# Patient Record
Sex: FEMALE | Race: OTHER | URBAN - METROPOLITAN AREA
[De-identification: names, ages, dates, MRNs, and addresses within clinical notes are randomized per-mention and may not be internally consistent; named-entity substitution may affect disease eponyms.]

---

## 2018-03-23 ENCOUNTER — APPOINTMENT (RX ONLY)
Dept: URBAN - METROPOLITAN AREA CLINIC 51 | Facility: CLINIC | Age: 33
Setting detail: DERMATOLOGY
End: 2018-03-23

## 2018-03-23 DIAGNOSIS — Z41.9 ENCOUNTER FOR PROCEDURE FOR PURPOSES OTHER THAN REMEDYING HEALTH STATE, UNSPECIFIED: ICD-10-CM

## 2018-03-23 PROCEDURE — ? LASER HAIR REMOVAL

## 2018-03-23 NOTE — PROCEDURE: LASER HAIR REMOVAL
Spot Size: 10 mm
Post-Care Instructions: I reviewed with the patient in detail post-care instructions. Patient should avoid sun for a minimum of 4 weeks before and after treatment.
Anesthesia Type: 1% lidocaine with epinephrine
Spot Size: 8 mm
Pulse Duration: auto
Fluence (Will Not Render If 0): [address] [facility]
Post-Procedure Care: Immediate endpoint: perifollicular erythema and edema. Hydrocortisone cream applied. Post care reviewed with patient.
Render Post-Care In The Note: No
Treatment Number: 0
Laser Type: diode 810nm
Number Of Prepaid Treatments (Will Not Render If 0): 6
Cooling: DCD setting
Detail Level: Detailed
Cooling: chill tip
Total Pulses: [address]
Total Pulses: [address]
Fluence (Will Not Render If 0): 10
Fluence (Will Not Render If 0): [address]
Fluence (Will Not Render If 0): 15
Pre-Procedure: Prior to proceeding the treatment areas were cleaned and all present put on their eye protection.
Treatment Number: 1
Consent: Written consent obtained, risks reviewed including but not limited to crusting, scabbing, blistering, scarring, darker or lighter pigmentary change, paradoxical hair regrowth, incomplete removal of hair and infection.
Topical Anesthesia Type: 9.6% lidocaine

## 2018-04-20 ENCOUNTER — APPOINTMENT (RX ONLY)
Dept: URBAN - METROPOLITAN AREA CLINIC 51 | Facility: CLINIC | Age: 33
Setting detail: DERMATOLOGY
End: 2018-04-20

## 2018-04-20 DIAGNOSIS — Z41.9 ENCOUNTER FOR PROCEDURE FOR PURPOSES OTHER THAN REMEDYING HEALTH STATE, UNSPECIFIED: ICD-10-CM

## 2018-04-20 PROCEDURE — ? LASER HAIR REMOVAL

## 2018-04-20 NOTE — HPI: COSMETIC (LASER HAIR REMOVAL)
Have You Had Laser Hair Removal Before?: has had previous treatment
When Outside In The Sun, Do You...: mostly tans, rarely burns
When Was Your Last Laser Treatment?: 03/23/2018
Number Of Treatments: 1

## 2018-04-20 NOTE — PROCEDURE: LASER HAIR REMOVAL
Spot Size: 10 mm
Treatment Number: 0
Fluence (Will Not Render If 0): [address]
Pulse Duration: auto
Spot Size: 8 mm
Pre-Procedure: Prior to proceeding the treatment areas were cleaned and all present put on their eye protection.
Post-Procedure Care: Immediate endpoint: perifollicular erythema and edema. Hydrocortisone cream applied. Post care reviewed with patient.
Render Post-Care In The Note: No
Consent: Written consent obtained, risks reviewed including but not limited to crusting, scabbing, blistering, scarring, darker or lighter pigmentary change, paradoxical hair regrowth, incomplete removal of hair and infection.
Total Pulses: [address]
Number Of Prepaid Treatments (Will Not Render If 0): 6
Topical Anesthesia Type: 9.6% lidocaine
Laser Type: diode 810nm
Fluence (Will Not Render If 0): 10
Cooling: chill tip
Fluence (Will Not Render If 0): 15
Cooling: DCD setting
Anesthesia Type: 1% lidocaine with epinephrine
Total Pulses: [address]
Post-Care Instructions: I reviewed with the patient in detail post-care instructions. Patient should avoid sun for a minimum of 4 weeks before and after treatment.
Detail Level: Detailed
Treatment Number: 2
Fluence (Will Not Render If 0): [address] [facility]

## 2018-05-16 ENCOUNTER — APPOINTMENT (RX ONLY)
Dept: URBAN - METROPOLITAN AREA CLINIC 51 | Facility: CLINIC | Age: 33
Setting detail: DERMATOLOGY
End: 2018-05-16

## 2018-05-16 DIAGNOSIS — Z41.9 ENCOUNTER FOR PROCEDURE FOR PURPOSES OTHER THAN REMEDYING HEALTH STATE, UNSPECIFIED: ICD-10-CM

## 2018-05-16 PROCEDURE — ? LASER HAIR REMOVAL

## 2018-05-16 NOTE — PROCEDURE: LASER HAIR REMOVAL
Spot Size: 1.5 mm
Total Pulses: 28
Laser Type: diode 810nm
Number Of Prepaid Treatments (Will Not Render If 0): 6
Fluence (Will Not Render If 0): [address] [facility]
Cooling: DCD setting
Post-Care Instructions: I reviewed with the patient in detail post-care instructions. Patient should avoid sun for a minimum of 4 weeks before and after treatment.
Treatment Number: 0
Treatment Number: 3
Pulse Duration: 0.45 ms
Fluence (Will Not Render If 0): [address] [facility], [place]
Pre-Procedure: Prior to proceeding the treatment areas were cleaned and all present put on their eye protection.
Fluence (Will Not Render If 0): 20
Consent: Written consent obtained, risks reviewed including but not limited to crusting, scabbing, blistering, scarring, darker or lighter pigmentary change, paradoxical hair regrowth, incomplete removal of hair and infection.
Total Pulses: [address]
Detail Level: Zone
Fluence (Will Not Render If 0): 12
Cooling: chill tip
Post-Procedure Care: Mild erythema and  SPF 30 Applied. Hydrocortisone applied. Post care reviewed with patient and instructed to call with any concerns.
Render Post-Care In The Note: No
Spot Size: 8 mm

## 2018-05-16 NOTE — HPI: COSMETIC (LASER HAIR REMOVAL)
Have You Had Laser Hair Removal Before?: has had previous treatment
When Was Your Last Laser Treatment?: 04/20/2018
Number Of Treatments: 2

## 2018-06-13 ENCOUNTER — APPOINTMENT (RX ONLY)
Dept: URBAN - METROPOLITAN AREA CLINIC 51 | Facility: CLINIC | Age: 33
Setting detail: DERMATOLOGY
End: 2018-06-13

## 2018-06-13 DIAGNOSIS — Z41.9 ENCOUNTER FOR PROCEDURE FOR PURPOSES OTHER THAN REMEDYING HEALTH STATE, UNSPECIFIED: ICD-10-CM

## 2018-06-13 PROCEDURE — ? LASER HAIR REMOVAL

## 2018-06-13 NOTE — PROCEDURE: LASER HAIR REMOVAL
Treatment Number: 4
Treatment Number: 0
Post-Care Instructions: I reviewed with the patient in detail post-care instructions. Patient should avoid sun for a minimum of 4 weeks before and after treatment.
Fluence (Will Not Render If 0): [address] [facility]
Number Of Prepaid Treatments (Will Not Render If 0): 6
Total Pulses: [address]
Spot Size: 1.5 mm
Fluence (Will Not Render If 0): [address] [facility]
Pulse Duration: 0.45 ms
Fluence (Will Not Render If 0): [address] [facility]
Pre-Procedure: Prior to proceeding the treatment areas were cleaned and all present put on their eye protection.
Detail Level: Zone
Cooling: DCD setting
Post-Procedure Care: Mild erythema and  SPF 30 Applied. Hydrocortisone applied. Post care reviewed with patient and instructed to call with any concerns.
Laser Type: diode 810nm
Consent: Written consent obtained, risks reviewed including but not limited to crusting, scabbing, blistering, scarring, darker or lighter pigmentary change, paradoxical hair regrowth, incomplete removal of hair and infection.
Render Post-Care In The Note: No
Cooling: chill tip
Total Pulses: 28
Fluence (Will Not Render If 0): 20
Spot Size: 8 mm

## 2018-06-13 NOTE — HPI: COSMETIC (LASER HAIR REMOVAL)
Have You Had Laser Hair Removal Before?: has had previous treatment
When Was Your Last Laser Treatment?: 05/16/2018
Number Of Treatments: 3

## 2018-07-11 ENCOUNTER — APPOINTMENT (RX ONLY)
Dept: URBAN - METROPOLITAN AREA CLINIC 51 | Facility: CLINIC | Age: 33
Setting detail: DERMATOLOGY
End: 2018-07-11

## 2018-07-11 DIAGNOSIS — Z41.9 ENCOUNTER FOR PROCEDURE FOR PURPOSES OTHER THAN REMEDYING HEALTH STATE, UNSPECIFIED: ICD-10-CM

## 2018-07-11 PROCEDURE — ? LASER HAIR REMOVAL

## 2018-07-11 NOTE — HPI: COSMETIC (LASER HAIR REMOVAL)
Have You Had Laser Hair Removal Before?: has had previous treatment
When Was Your Last Laser Treatment?: 06/13/2018
Number Of Treatments: 4

## 2018-07-11 NOTE — PROCEDURE: LASER HAIR REMOVAL
Pre-Procedure: Prior to proceeding the treatment areas were cleaned and all present put on their eye protection.
Treatment Number: 0
Fluence (Will Not Render If 0): [address] [facility]
Cooling: DCD setting
Number Of Prepaid Treatments (Will Not Render If 0): 6
Pulse Duration: auto
Spot Size: 10 mm
Spot Size: 8 mm
Render Post-Care In The Note: No
Treatment Number: 5
Post-Procedure Care: Immediate endpoint: perifollicular erythema and edema. Hydrocortisone cream applied. Post care reviewed with patient.
Laser Type: diode 810nm
Consent: Written consent obtained, risks reviewed including but not limited to crusting, scabbing, blistering, scarring, darker or lighter pigmentary change, paradoxical hair regrowth, incomplete removal of hair and infection.
Total Pulses: [address] [facility] [address]
Cooling: chill tip
Fluence (Will Not Render If 0): 15
Post-Care Instructions: I reviewed with the patient in detail post-care instructions. Patient should avoid sun for a minimum of 4 weeks before and after treatment.
Total Pulses: [address]
Anesthesia Type: 1% lidocaine with epinephrine
Fluence (Will Not Render If 0): [address]
Detail Level: Detailed
Topical Anesthesia Type: 9.6% lidocaine

## 2018-08-08 ENCOUNTER — APPOINTMENT (RX ONLY)
Dept: URBAN - METROPOLITAN AREA CLINIC 51 | Facility: CLINIC | Age: 33
Setting detail: DERMATOLOGY
End: 2018-08-08

## 2018-08-08 DIAGNOSIS — Z41.9 ENCOUNTER FOR PROCEDURE FOR PURPOSES OTHER THAN REMEDYING HEALTH STATE, UNSPECIFIED: ICD-10-CM

## 2018-08-08 PROCEDURE — ? LASER HAIR REMOVAL

## 2018-08-08 NOTE — PROCEDURE: LASER HAIR REMOVAL
Pulse Duration: auto
Consent: Written consent obtained, risks reviewed including but not limited to crusting, scabbing, blistering, scarring, darker or lighter pigmentary change, paradoxical hair regrowth, incomplete removal of hair and infection.
Treatment Number: 0
Anesthesia Type: 1% lidocaine with epinephrine
Total Pulses: [address]
Fluence (Will Not Render If 0): [address] [facility]
Fluence (Will Not Render If 0): 15
Spot Size: 10 mm
Total Pulses: [address]
Cooling: DCD setting
Laser Type: diode 810nm
Detail Level: Detailed
Render Post-Care In The Note: No
Fluence (Will Not Render If 0): [address]
Topical Anesthesia Type: 9.6% lidocaine
Post-Care Instructions: I reviewed with the patient in detail post-care instructions. Patient should avoid sun for a minimum of 4 weeks before and after treatment.
Pre-Procedure: Prior to proceeding the treatment areas were cleaned and all present put on their eye protection.
Fluence (Will Not Render If 0): [address] [facility]
Post-Procedure Care: Immediate endpoint: perifollicular erythema and edema. Hydrocortisone cream applied. Post care reviewed with patient.
Spot Size: 8 mm
Cooling: chill tip
Number Of Prepaid Treatments (Will Not Render If 0): 8
Treatment Number: 6

## 2018-08-08 NOTE — HPI: COSMETIC (LASER HAIR REMOVAL)
Have You Had Laser Hair Removal Before?: has had previous treatment
When Was Your Last Laser Treatment?: 07/11/2018
Number Of Treatments: 5

## 2018-09-26 ENCOUNTER — APPOINTMENT (RX ONLY)
Dept: URBAN - METROPOLITAN AREA CLINIC 51 | Facility: CLINIC | Age: 33
Setting detail: DERMATOLOGY
End: 2018-09-26

## 2018-09-26 DIAGNOSIS — Z41.9 ENCOUNTER FOR PROCEDURE FOR PURPOSES OTHER THAN REMEDYING HEALTH STATE, UNSPECIFIED: ICD-10-CM

## 2018-09-26 PROCEDURE — ? LASER HAIR REMOVAL

## 2018-09-26 NOTE — HPI: COSMETIC (LASER HAIR REMOVAL)
Have You Had Laser Hair Removal Before?: has had previous treatment
When Was Your Last Laser Treatment?: 08/08/2018
Number Of Treatments: 7

## 2018-09-26 NOTE — PROCEDURE: LASER HAIR REMOVAL
Anesthesia Type: 1% lidocaine with epinephrine
Laser Type: diode 810nm
Spot Size: 10 mm
Post-Procedure Care: Immediate endpoint: perifollicular erythema and edema. Hydrocortisone cream applied. Post care reviewed with patient.
Detail Level: Detailed
Pulse Duration: auto
Spot Size: 8 mm
Treatment Number: 0
Post-Care Instructions: I reviewed with the patient in detail post-care instructions. Patient should avoid sun for a minimum of 4 weeks before and after treatment.
Render Post-Care In The Note: No
Number Of Prepaid Treatments (Will Not Render If 0): 8
Total Pulses: [address] [facility] [address] [facility] [address]
Treatment Number: 7
Fluence (Will Not Render If 0): 15
Fluence (Will Not Render If 0): [address]
Total Pulses: [address]
Pre-Procedure: Prior to proceeding the treatment areas were cleaned and all present put on their eye protection.
Cooling: DCD setting
Consent: Written consent obtained, risks reviewed including but not limited to crusting, scabbing, blistering, scarring, darker or lighter pigmentary change, paradoxical hair regrowth, incomplete removal of hair and infection.
Topical Anesthesia Type: 9.6% lidocaine
Fluence (Will Not Render If 0): [address] [facility]
Cooling: chill tip
Fluence (Will Not Render If 0): [address] [facility]

## 2024-03-25 ENCOUNTER — APPOINTMENT (RX ONLY)
Dept: URBAN - METROPOLITAN AREA CLINIC 51 | Facility: CLINIC | Age: 39
Setting detail: DERMATOLOGY
End: 2024-03-25

## 2024-03-25 DIAGNOSIS — Z41.9 ENCOUNTER FOR PROCEDURE FOR PURPOSES OTHER THAN REMEDYING HEALTH STATE, UNSPECIFIED: ICD-10-CM

## 2024-03-25 PROCEDURE — ? LASER HAIR REMOVAL

## 2024-03-25 NOTE — PROCEDURE: LASER HAIR REMOVAL
Number Of Prepaid Treatments (Will Not Render If 0): 8
Number Of Prepaid Treatments (Will Not Render If 0): 0
Spot Size: 12 mm
Fluence (Will Not Render If 0): 20
Consent: Written consent obtained, risks reviewed including but not limited to crusting, scabbing, blistering, scarring, darker or lighter pigmentary change, paradoxical hair regrowth, incomplete removal of hair and infection.
Spot Size: 10 mm
Topical Anesthesia Type: 9.6% lidocaine
Post-Procedure Care: Immediate endpoint: perifollicular erythema and edema. Hydrocortisone cream applied. Post care reviewed with patient.
Cooling: chill tip
Were Eye Shields Employed?: No
Laser Type: Lumenis Splendor X
Fluence (Will Not Render If 0): 19
Cooling: DCD setting
Post-Care Instructions: I reviewed with the patient in detail post-care instructions. Patient should avoid sun for a minimum of 4 weeks before and after treatment.
Total Pulses (Settings #3): 548
Spot Size: 8 mm
Fluence (Will Not Render If 0): 26
Detail Level: Detailed
Eye Shield Text: Given the treatment area eye shields were inserted prior to treatment.
Anesthesia Type: 1% lidocaine with epinephrine
Total Pulses: 233
Fluence (Will Not Render If 0): 15
Pre-Procedure: Prior to proceeding the treatment areas were cleaned and all present put on their eye protection.

## 2024-03-25 NOTE — HPI: COSMETIC (LASER HAIR REMOVAL)
Have You Had Laser Hair Removal Before?: has had previous treatment
When Was Your Last Laser Treatment?: 09/26/2018
Number Of Treatments: 7

## 2024-05-20 ENCOUNTER — APPOINTMENT (RX ONLY)
Dept: URBAN - METROPOLITAN AREA CLINIC 51 | Facility: CLINIC | Age: 39
Setting detail: DERMATOLOGY
End: 2024-05-20

## 2024-05-20 DIAGNOSIS — Z41.9 ENCOUNTER FOR PROCEDURE FOR PURPOSES OTHER THAN REMEDYING HEALTH STATE, UNSPECIFIED: ICD-10-CM

## 2024-05-20 PROCEDURE — ? LASER HAIR REMOVAL

## 2024-05-20 NOTE — PROCEDURE: LASER HAIR REMOVAL
Render Post-Care In The Note: No
Cooling: chill tip
Fluence (Will Not Render If 0): 26
Number Of Prepaid Treatments (Will Not Render If 0): 0
Fluence (Will Not Render If 0): 20
Spot Size: 12 mm
Pre-Procedure: Prior to proceeding the treatment areas were cleaned and all present put on their eye protection.
Anesthesia Type: 1% lidocaine with epinephrine
Detail Level: Detailed
Total Pulses: 118
Treatment Number: 9
Fluence (Will Not Render If 0): 15
Topical Anesthesia Type: 9.6% lidocaine
Spot Size: 10 mm
Consent: Written consent obtained, risks reviewed including but not limited to crusting, scabbing, blistering, scarring, darker or lighter pigmentary change, paradoxical hair regrowth, incomplete removal of hair and infection.
Post-Procedure Care: Immediate endpoint: perifollicular erythema and edema. Hydrocortisone cream applied. Post care reviewed with patient.
Eye Shield Text: Given the treatment area eye shields were inserted prior to treatment.
Spot Size: 8 mm
Laser Type: Lumenis Splendor X
Total Pulses (Settings #3): 548
Fluence (Will Not Render If 0): 19
Cooling: DCD setting
Post-Care Instructions: I reviewed with the patient in detail post-care instructions. Patient should avoid sun for a minimum of 4 weeks before and after treatment.

## 2024-05-20 NOTE — HPI: COSMETIC (LASER HAIR REMOVAL)
Have You Had Laser Hair Removal Before?: has had previous treatment
When Was Your Last Laser Treatment?: 03/25/2024
Number Of Treatments: 8

## 2024-06-17 ENCOUNTER — APPOINTMENT (RX ONLY)
Dept: URBAN - METROPOLITAN AREA CLINIC 51 | Facility: CLINIC | Age: 39
Setting detail: DERMATOLOGY
End: 2024-06-17

## 2024-06-17 DIAGNOSIS — Z41.9 ENCOUNTER FOR PROCEDURE FOR PURPOSES OTHER THAN REMEDYING HEALTH STATE, UNSPECIFIED: ICD-10-CM

## 2024-06-17 PROCEDURE — ? LASER HAIR REMOVAL

## 2024-06-17 NOTE — HPI: COSMETIC (LASER HAIR REMOVAL)
Have You Had Laser Hair Removal Before?: has had previous treatment
When Was Your Last Laser Treatment?: 05/20/2024
Number Of Treatments: 9

## 2024-06-17 NOTE — PROCEDURE: LASER HAIR REMOVAL
Treatment Number: 0
Eye Shield Text: Given the treatment area eye shields were inserted prior to treatment.
Fluence (Will Not Render If 0): 19
Pre-Procedure: Prior to proceeding the treatment areas were cleaned and all present put on their eye protection.
Spot Size: 10 mm
Post-Care Instructions: I reviewed with the patient in detail post-care instructions. Patient should avoid sun for a minimum of 4 weeks before and after treatment.
Cooling: chill tip
Fluence (Will Not Render If 0): 15
Render Post-Care In The Note: No
Post-Procedure Care: Immediate endpoint: perifollicular erythema and edema. Hydrocortisone cream applied. Post care reviewed with patient.
Treatment Number: 10
Consent: Written consent obtained, risks reviewed including but not limited to crusting, scabbing, blistering, scarring, darker or lighter pigmentary change, paradoxical hair regrowth, incomplete removal of hair and infection.
Topical Anesthesia Type: 9.6% lidocaine
Detail Level: Detailed
Laser Type: Lumenis Splendor X
Cooling: DCD setting
Spot Size: 12 mm
Fluence (Will Not Render If 0): 26
Total Pulses: 235
Spot Size: 8 mm
Fluence (Will Not Render If 0): 20
Total Pulses (Settings #3): 548
Anesthesia Type: 1% lidocaine with epinephrine

## 2024-10-07 ENCOUNTER — APPOINTMENT (RX ONLY)
Dept: URBAN - METROPOLITAN AREA CLINIC 51 | Facility: CLINIC | Age: 39
Setting detail: DERMATOLOGY
End: 2024-10-07

## 2024-10-07 DIAGNOSIS — Z41.9 ENCOUNTER FOR PROCEDURE FOR PURPOSES OTHER THAN REMEDYING HEALTH STATE, UNSPECIFIED: ICD-10-CM

## 2024-10-07 PROCEDURE — ? LASER HAIR REMOVAL

## 2024-10-07 NOTE — PROCEDURE: LASER HAIR REMOVAL
Topical Anesthesia Type: 9.6% lidocaine
Treatment Number: 0
Fluence (Will Not Render If 0): 19
Render Post-Care In The Note: No
Spot Size: 10 mm
Anesthesia Type: 1% lidocaine with epinephrine
Cooling: chill tip
Treatment Number: 10
Cooling: DCD setting
Pre-Procedure: Prior to proceeding the treatment areas were cleaned and all present put on their eye protection.
Fluence (Will Not Render If 0): 20
Total Pulses: 100
Post-Procedure Care: Immediate endpoint: perifollicular erythema and edema. Hydrocortisone cream applied. Post care reviewed with patient.
Spot Size: 12 mm
Fluence (Will Not Render If 0): 26
Total Pulses (Settings #3): 548
Consent: Written consent obtained, risks reviewed including but not limited to crusting, scabbing, blistering, scarring, darker or lighter pigmentary change, paradoxical hair regrowth, incomplete removal of hair and infection.
Spot Size: 8 mm
Detail Level: Detailed
Laser Type: Lumenis Splendor X
Fluence (Will Not Render If 0): 15
Post-Care Instructions: I reviewed with the patient in detail post-care instructions. Patient should avoid sun for a minimum of 4 weeks before and after treatment.
Eye Shield Text: Given the treatment area eye shields were inserted prior to treatment.

## 2024-11-25 ENCOUNTER — APPOINTMENT (RX ONLY)
Dept: URBAN - METROPOLITAN AREA CLINIC 51 | Facility: CLINIC | Age: 39
Setting detail: DERMATOLOGY
End: 2024-11-25

## 2024-11-25 DIAGNOSIS — Z41.9 ENCOUNTER FOR PROCEDURE FOR PURPOSES OTHER THAN REMEDYING HEALTH STATE, UNSPECIFIED: ICD-10-CM

## 2024-11-25 PROCEDURE — ? LASER HAIR REMOVAL

## 2024-11-25 NOTE — PROCEDURE: LASER HAIR REMOVAL
Anesthesia Type: 1% lidocaine with epinephrine
Fluence (Will Not Render If 0): 20
Total Pulses: 100
Number Of Prepaid Treatments (Will Not Render If 0): 11
Render Post-Care In The Note: No
Fluence (Will Not Render If 0): 26
Number Of Prepaid Treatments (Will Not Render If 0): 0
Fluence (Will Not Render If 0): 19
Spot Size: 8 mm
Total Pulses (Settings #3): 548
Laser Type: Lumenis Splendor X
Eye Shield Text: Given the treatment area eye shields were inserted prior to treatment.
Pre-Procedure: Prior to proceeding the treatment areas were cleaned and all present put on their eye protection.
Cooling: chill tip
Consent: Written consent obtained, risks reviewed including but not limited to crusting, scabbing, blistering, scarring, darker or lighter pigmentary change, paradoxical hair regrowth, incomplete removal of hair and infection.
Spot Size: 10 mm
Topical Anesthesia Type: 9.6% lidocaine
Cooling: DCD setting
Post-Procedure Care: Immediate endpoint: perifollicular erythema and edema. Hydrocortisone cream applied. Post care reviewed with patient.
Spot Size: 12 mm
Fluence (Will Not Render If 0): 15
Detail Level: Detailed
Post-Care Instructions: I reviewed with the patient in detail post-care instructions. Patient should avoid sun for a minimum of 4 weeks before and after treatment.

## 2024-11-25 NOTE — HPI: COSMETIC (LASER HAIR REMOVAL)
Have You Had Laser Hair Removal Before?: has had previous treatment
When Was Your Last Laser Treatment?: 10/07/2024
Number Of Treatments: 10

## 2025-01-20 ENCOUNTER — APPOINTMENT (OUTPATIENT)
Dept: URBAN - METROPOLITAN AREA CLINIC 51 | Facility: CLINIC | Age: 40
Setting detail: DERMATOLOGY
End: 2025-01-20

## 2025-01-20 DIAGNOSIS — Z41.9 ENCOUNTER FOR PROCEDURE FOR PURPOSES OTHER THAN REMEDYING HEALTH STATE, UNSPECIFIED: ICD-10-CM

## 2025-01-20 PROCEDURE — ? LASER HAIR REMOVAL

## 2025-01-20 NOTE — PROCEDURE: LASER HAIR REMOVAL
Post-Procedure Care: Immediate endpoint: perifollicular erythema and edema. Hydrocortisone cream applied. Post care reviewed with patient.
Number Of Prepaid Treatments (Will Not Render If 0): 10
Consent: Written consent obtained, risks reviewed including but not limited to crusting, scabbing, blistering, scarring, darker or lighter pigmentary change, paradoxical hair regrowth, incomplete removal of hair and infection.
Total Pulses: 159
Cooling: chill tip
Number Of Prepaid Treatments (Will Not Render If 0): 0
Post-Care Instructions: I reviewed with the patient in detail post-care instructions. Patient should avoid sun for a minimum of 4 weeks before and after treatment.
Eye Shield Text: Given the treatment area eye shields were inserted prior to treatment.
Fluence (Will Not Render If 0): 19
Topical Anesthesia Type: 9.6% lidocaine
Laser Type: Lumenis Splendor X
Spot Size: 10 mm
Fluence (Will Not Render If 0): 15
Fluence (Will Not Render If 0): 20
Anesthesia Type: 1% lidocaine with epinephrine
Cooling: DCD setting
Render Post-Care In The Note: No
Detail Level: Detailed
Pre-Procedure: Prior to proceeding the treatment areas were cleaned and all present put on their eye protection.
Treatment Number: 2
Spot Size: 8 mm
Spot Size: 12 mm
Fluence (Will Not Render If 0): 26
Total Pulses (Settings #3): 548

## 2025-01-20 NOTE — HPI: COSMETIC (LASER HAIR REMOVAL)
Have You Had Laser Hair Removal Before?: has had previous treatment
When Was Your Last Laser Treatment?: 11/25/2024
Number Of Treatments: 1